# Patient Record
Sex: MALE | URBAN - METROPOLITAN AREA
[De-identification: names, ages, dates, MRNs, and addresses within clinical notes are randomized per-mention and may not be internally consistent; named-entity substitution may affect disease eponyms.]

---

## 2024-02-15 ENCOUNTER — OV NP (OUTPATIENT)
Dept: URBAN - METROPOLITAN AREA CLINIC 63 | Facility: CLINIC | Age: 44
End: 2024-02-15

## 2024-02-15 NOTE — HPI-PREVIOUS LABS
1/5/2024 lipid panel significant for HDL 27, A1c 5.7, CBC significant for MCV 79.6, MCH 26.7, CMP within normal limits, UA within normal limits, TSH within normal limits, PSA within normal limits.

## 2024-02-21 ENCOUNTER — OV NP (OUTPATIENT)
Dept: URBAN - METROPOLITAN AREA CLINIC 63 | Facility: CLINIC | Age: 44
End: 2024-02-21

## 2024-02-21 RX ORDER — IBUPROFEN 800 MG/1
TAKE 1 TABLET BY MOUTH EVERY 8 HOURS TABLET, FILM COATED ORAL
Qty: 30 EACH | Refills: 0 | Status: ACTIVE | COMMUNITY

## 2024-02-21 RX ORDER — POLYETHYLENE GLYCOL 3350, SODIUM CHLORIDE, SODIUM BICARBONATE, POTASSIUM CHLORIDE 420; 11.2; 5.72; 1.48 G/4L; G/4L; G/4L; G/4L
AS DIRECTED POWDER, FOR SOLUTION ORAL ONCE
Qty: 4000 | Refills: 0 | OUTPATIENT
Start: 2024-02-19 | End: 2024-02-20

## 2024-02-21 RX ORDER — SILDENAFIL 100 MG/1
TAKE 1 TABLET BY MOUTH EVERY DAY TABLET, FILM COATED ORAL
Qty: 30 EACH | Refills: 0 | Status: ACTIVE | COMMUNITY

## 2024-02-21 RX ORDER — SEMAGLUTIDE 1.7 MG/.75ML
INJECTION, SOLUTION SUBCUTANEOUS
Qty: 9 MILLILITER | Status: ACTIVE | COMMUNITY

## 2024-02-21 RX ORDER — ONDANSETRON HYDROCHLORIDE 4 MG/1
1 TABLET TABLET, FILM COATED ORAL
Qty: 2 | Refills: 0 | OUTPATIENT
Start: 2024-02-19

## 2024-02-21 NOTE — HPI-TODAY'S VISIT:
Connor is a pleasant 43-year-old male who presents today for evaluation of a screening colonoscopyLabs dated 1/5/2024 showed normal hemoglobin.  MCV 79.6.  Normal platelets.  Normal renal function.  Normal LFTs.  Normal TSH.